# Patient Record
Sex: MALE | Race: WHITE | URBAN - METROPOLITAN AREA
[De-identification: names, ages, dates, MRNs, and addresses within clinical notes are randomized per-mention and may not be internally consistent; named-entity substitution may affect disease eponyms.]

---

## 2022-10-27 ENCOUNTER — APPOINTMENT (RX ONLY)
Dept: URBAN - METROPOLITAN AREA CLINIC 81 | Facility: CLINIC | Age: 57
Setting detail: DERMATOLOGY
End: 2022-10-27

## 2022-10-27 DIAGNOSIS — M33.1 OTHER DERMATOMYOSITIS: ICD-10-CM | Status: INADEQUATELY CONTROLLED

## 2022-10-27 PROBLEM — L30.9 DERMATITIS, UNSPECIFIED: Status: ACTIVE | Noted: 2022-10-27

## 2022-10-27 PROCEDURE — ? PRESCRIPTION MEDICATION MANAGEMENT

## 2022-10-27 PROCEDURE — ? PRESCRIPTION

## 2022-10-27 PROCEDURE — 99204 OFFICE O/P NEW MOD 45 MIN: CPT

## 2022-10-27 PROCEDURE — ? COUNSELING

## 2022-10-27 PROCEDURE — ? FULL BODY SKIN EXAM - DECLINED

## 2022-10-27 PROCEDURE — ? REFERRAL

## 2022-10-27 PROCEDURE — ? ORDER TESTS

## 2022-10-27 PROCEDURE — ? ADDITIONAL NOTES

## 2022-10-27 RX ORDER — OMEPRAZOLE 20 MG/1
CAPSULE, DELAYED RELEASE ORAL
Qty: 60 | Refills: 1 | Status: ERX

## 2022-10-27 RX ORDER — PREDNISONE 20 MG/1
TABLET ORAL
Qty: 40 | Refills: 0 | Status: ERX

## 2022-10-27 ASSESSMENT — LOCATION ZONE DERM: LOCATION ZONE: TRUNK

## 2022-10-27 ASSESSMENT — SEVERITY ASSESSMENT: SEVERITY: MODERATE TO SEVERE

## 2022-10-27 ASSESSMENT — LOCATION DETAILED DESCRIPTION DERM: LOCATION DETAILED: LEFT MEDIAL SUPERIOR CHEST

## 2022-10-27 ASSESSMENT — LOCATION SIMPLE DESCRIPTION DERM: LOCATION SIMPLE: CHEST

## 2022-10-27 NOTE — HPI: OTHER
Condition:: Dermatomyocitis \\n
Please Describe Your Condition:: Patient presents with heart problems, COPD, and breaking out on face and head. PCP suspects dermatomyositis. Patient had lower leg EMG where nerve damage was noticed. Patient notes loss in muscle, fatigue, and nerve pain. Nerve pain started 3 months ago, sensation is spreading to other parts of the body.

## 2022-10-27 NOTE — PROCEDURE: ADDITIONAL NOTES
Additional Notes: Patient reports heart palpitations in the last year, as well as COPD that have seemed to intensified simultaneously. Patient notes deep purple rash starting around the eye that would spread out to other parts of face. On fourth round of prednisone and visually rash is improving. Patient notes generalized dramatic muscle loss in the last couple of weeks. Thrombosis of capillary loops on nails present upon examination. \\n\\nPatient notes that rash was also located under arm and on scalp at the same time as it was present around his eyes. Scalp scabbed over white. Prednisone was able to calm this significantly. Subtle rash reported on neck and collar bone area.\\n\\nNo history of asthma in childhood or previous smoking. Respiratory issues began 6 years ago per pt and heart issues 1 year ago. Nothing definitively diagnosed regarding heart issues. Patient recently had heart monitored and nothing remarkable was noted. No lesions presents or have been present on knuckles per patient. Lab and diagnostic work-up should be done as patient is clinically suspicious for dermatomyositis. Prednisone and omeprazole ordered today. Patient counseled today on sunscreen use. Recommend undergoing search for potential malignancy through PCP (including colonoscopy and CT). Gave patient contact information for a local rheumatologist (Franko Ashraf MD). RTC 1 month for re-evaluation.
Render Risk Assessment In Note?: no
Detail Level: Simple

## 2022-10-27 NOTE — PROCEDURE: PRESCRIPTION MEDICATION MANAGEMENT
Initiate Treatment: PREDNISONE 20 mg \\nOMEPRAZOLE 20 mg BID PRN Dyspepsia/acid reflex sx secondary to prednisone \\nMagnesium and Calcium OTC supplement daily recommended for prolonged prednisone therapy.

## 2022-10-27 NOTE — PROCEDURE: ORDER TESTS
Expected Date Of Service: 10/27/2022
Performing Laboratory: 0
Bill For Surgical Tray: no
Billing Type: Third-Party Bill

## 2022-10-27 NOTE — PROCEDURE: PRESCRIPTION MEDICATION MANAGEMENT
Plan: Referred to Franko Ashraf MD (Rheumatology)\\nF/u with PCP for GI referral for colonoscopy \\nPCP to order CT of Chest/Abdomen/pelvis to r/o malignancy.\\nF/u with cardiology and pulmonology- pt is already established.  \\nLabs ordered.  Advised pt to go to [employer].  Miscellaneous lab - handwritten test code on order form today (myositis panel [id number])\\nContinue prednisone.  New Rx for 20mg qam x 40 days, RTC to clinic in 1 month.  \\nHold off on Bx today - Rash is not present on exam d/t current prednisone therapy. Plan: Referred to Franko Ashraf MD (Rheumatology)\\nF/u with PCP for GI referral for colonoscopy \\nPCP to order CT of Chest/Abdomen/pelvis to r/o malignancy.\\nF/u with cardiology and pulmonology- pt is already established.  \\nLabs ordered.  Advised pt to go to [facility].  Miscellaneous lab - handwritten test code on order form today (myositis panel [id number])\\nContinue prednisone.  New Rx for 20mg qam x 40 days, RTC to clinic in 1 month.  \\nHold off on Bx today - Rash is not present on exam d/t current prednisone therapy.

## 2022-11-29 ENCOUNTER — APPOINTMENT (RX ONLY)
Dept: URBAN - METROPOLITAN AREA CLINIC 81 | Facility: CLINIC | Age: 57
Setting detail: DERMATOLOGY
End: 2022-11-29

## 2022-11-29 DIAGNOSIS — M33.1 OTHER DERMATOMYOSITIS: ICD-10-CM

## 2022-11-29 PROBLEM — L30.9 DERMATITIS, UNSPECIFIED: Status: ACTIVE | Noted: 2022-11-29

## 2022-11-29 PROCEDURE — ? ADDITIONAL NOTES

## 2022-11-29 PROCEDURE — ? LAB REPORTS REVIEWED

## 2022-11-29 PROCEDURE — ? FULL BODY SKIN EXAM - DECLINED

## 2022-11-29 PROCEDURE — ? PRESCRIPTION MEDICATION MANAGEMENT

## 2022-11-29 PROCEDURE — 99214 OFFICE O/P EST MOD 30 MIN: CPT

## 2022-11-29 PROCEDURE — ? COUNSELING

## 2022-11-29 ASSESSMENT — LOCATION SIMPLE DESCRIPTION DERM: LOCATION SIMPLE: CHEST

## 2022-11-29 ASSESSMENT — LOCATION ZONE DERM: LOCATION ZONE: TRUNK

## 2022-11-29 ASSESSMENT — LOCATION DETAILED DESCRIPTION DERM: LOCATION DETAILED: LEFT MEDIAL SUPERIOR CHEST

## 2022-11-29 NOTE — PROCEDURE: PRESCRIPTION MEDICATION MANAGEMENT
Plan: Continue to f/u with Franko Ashraf MD (Rheumatology)\\nPt reports CT CAP was unremarkable.  Colonoscopy is scheduled for next week.\\nF/u with cardiology and pulmonology- pt is already established.  \\nHold off on Bx today - Rash is not present on exam d/t current meds.  Advised to RTC for Bx in the future if rash returns.

## 2022-11-29 NOTE — PROCEDURE: PRESCRIPTION MEDICATION MANAGEMENT
Continue Regimen: Current medications Rx by rheumatology.\\nDermatomyositis panel was negative, INGRID Positive.  \\nRecommended to discuss with rheumatology and PCP and f/u with [facility] neuromuscular team d/t sx of progressive muscle weakness and nerve pain for further eval.  \\nOMEPRAZOLE 20 mg BID PRN Dyspepsia/acid reflex sx secondary to prednisone \\nMagnesium and Calcium OTC supplement daily recommended for prolonged prednisone therapy. Continue Regimen: Current medications Rx by rheumatology.\\nDermatomyositis panel was negative, INGRID Positive.  \\nRecommended to discuss with rheumatology and PCP and f/u with [facility] neuromuscular team d/t sx of progressive muscle weakness and nerve pain for further eval.  \\nOMEPRAZOLE 20 mg BID PRN Dyspepsia/acid reflex sx secondary to prednisone \\nMagnesium and Calcium OTC supplement daily recommended for prolonged prednisone therapy.

## 2023-01-26 ENCOUNTER — APPOINTMENT (RX ONLY)
Dept: URBAN - METROPOLITAN AREA CLINIC 81 | Facility: CLINIC | Age: 58
Setting detail: DERMATOLOGY
End: 2023-01-26

## 2023-01-26 DIAGNOSIS — M33.1 OTHER DERMATOMYOSITIS: ICD-10-CM

## 2023-01-26 PROBLEM — L30.9 DERMATITIS, UNSPECIFIED: Status: ACTIVE | Noted: 2023-01-26

## 2023-01-26 PROCEDURE — ? ORDER TESTS

## 2023-01-26 PROCEDURE — 99214 OFFICE O/P EST MOD 30 MIN: CPT

## 2023-01-26 PROCEDURE — ? ADDITIONAL NOTES

## 2023-01-26 PROCEDURE — ? PRESCRIPTION

## 2023-01-26 PROCEDURE — ? PRESCRIPTION MEDICATION MANAGEMENT

## 2023-01-26 PROCEDURE — ? FULL BODY SKIN EXAM - DECLINED

## 2023-01-26 PROCEDURE — ? COUNSELING

## 2023-01-26 RX ORDER — HYDROXYZINE HYDROCHLORIDE 50 MG/1
TABLET, FILM COATED ORAL
Qty: 90 | Refills: 1 | Status: ERX

## 2023-01-26 ASSESSMENT — LOCATION ZONE DERM: LOCATION ZONE: TRUNK

## 2023-01-26 ASSESSMENT — LOCATION DETAILED DESCRIPTION DERM: LOCATION DETAILED: LEFT MEDIAL SUPERIOR CHEST

## 2023-01-26 ASSESSMENT — LOCATION SIMPLE DESCRIPTION DERM: LOCATION SIMPLE: CHEST

## 2023-01-26 NOTE — PROCEDURE: PRESCRIPTION MEDICATION MANAGEMENT
Detail Level: Detailed
Render In Strict Bullet Format?: No
Continue Regimen: Current medications Rx by rheumatology - azathioprine, starting hydroxychloroquine after cleared by ophthalmologist.  He continues to take prednisone 20mg BID.  \\nDermatomyositis panel was negative, INGRID Positive.  \\nRecommended to discuss with rheumatology and PCP.  Pt with progressive muscle weakness and paresthesias - has appt at [facility] neuromuscular team, but can’t get in to be seen until 8/2023.  Will call and reach out to practice and see if I can get pt in sooner.  \\nOMEPRAZOLE 20 mg BID PRN Dyspepsia/acid reflex sx secondary to prednisone \\nMagnesium and Calcium OTC supplement daily recommended for prolonged prednisone therapy.
Plan: Continue to f/u with Franko Ashraf MD (Rheumatology)\\nPt reports CT CAP was unremarkable.  Colonoscopy clear, scheduled for upper GI study.\\nPt with insomnia from prednisone - Rx hydroxyzine for sleep today.  \\nLabs reviewed.  With concern for malignancy, PSA ordered as well as f/u labs.  \\nF/u with cardiology and pulmonology- pt is already established.  Holster monitor was Normal per pt.  \\nHold off on Bx today - Rash is not present on exam d/t current meds.  Advised to RTC for Bx in the future if rash returns.

## 2023-01-26 NOTE — PROCEDURE: ADDITIONAL NOTES
Additional Notes: Patient reports heart palpitations in the last year, as well as COPD that have seemed to intensified simultaneously. Initial rash:  Patient notes deep purple rash starting around the eye that would spread out to other parts of face. Patient notes generalized dramatic muscle loss and numbness - worsening. Thrombosis of capillary loops on nails present upon examination. \\n\\nPatient notes that in the beginning his rash was also located under arm and on scalp at the same time as it was present around his eyes. Prednisone was able to calm this significantly. Subtle rash reported on neck and collar bone area as prior visit - not present today.\\n\\nNo history of asthma in childhood or previous smoking. Respiratory issues began 6 years ago per pt and heart issues 1 year ago. Nothing definitively diagnosed regarding heart issues. Patient recently had heart monitored and nothing remarkable was noted. No lesions presents or have been present on knuckles per patient.
Render Risk Assessment In Note?: no
Detail Level: Simple

## 2023-01-26 NOTE — PROCEDURE: ORDER TESTS
Bill For Surgical Tray: no
Billing Type: Third-Party Bill
Expected Date Of Service: 01/26/2023
Performing Laboratory: 0

## 2023-03-20 ENCOUNTER — APPOINTMENT (RX ONLY)
Dept: URBAN - METROPOLITAN AREA CLINIC 81 | Facility: CLINIC | Age: 58
Setting detail: DERMATOLOGY
End: 2023-03-20

## 2023-03-20 DIAGNOSIS — L30.9 DERMATITIS, UNSPECIFIED: ICD-10-CM | Status: INADEQUATELY CONTROLLED

## 2023-03-20 PROCEDURE — ? PRESCRIPTION MEDICATION MANAGEMENT

## 2023-03-20 PROCEDURE — ? PHOTO-DOCUMENTATION

## 2023-03-20 PROCEDURE — ? BIOPSY BY PUNCH METHOD

## 2023-03-20 PROCEDURE — ? TREATMENT REGIMEN

## 2023-03-20 PROCEDURE — 11105 PUNCH BX SKIN EA SEP/ADDL: CPT

## 2023-03-20 PROCEDURE — ? COUNSELING

## 2023-03-20 PROCEDURE — 11104 PUNCH BX SKIN SINGLE LESION: CPT

## 2023-03-20 PROCEDURE — ? ORDER TESTS

## 2023-03-20 ASSESSMENT — LOCATION SIMPLE DESCRIPTION DERM
LOCATION SIMPLE: LEFT CHEEK
LOCATION SIMPLE: LOWER BACK
LOCATION SIMPLE: RIGHT CHEEK
LOCATION SIMPLE: LEFT LOWER BACK
LOCATION SIMPLE: LEFT EYEBROW
LOCATION SIMPLE: LEFT MIDDLE FINGER
LOCATION SIMPLE: RIGHT HAND
LOCATION SIMPLE: RIGHT MIDDLE FINGER
LOCATION SIMPLE: RIGHT EYEBROW
LOCATION SIMPLE: LEFT HAND
LOCATION SIMPLE: CHEST

## 2023-03-20 ASSESSMENT — LOCATION DETAILED DESCRIPTION DERM
LOCATION DETAILED: RIGHT DISTAL DORSAL MIDDLE FINGER
LOCATION DETAILED: LEFT CENTRAL MALAR CHEEK
LOCATION DETAILED: 3RD WEB SPACE LEFT HAND
LOCATION DETAILED: STERNAL NOTCH
LOCATION DETAILED: RIGHT CENTRAL EYEBROW
LOCATION DETAILED: SUPERIOR LUMBAR SPINE
LOCATION DETAILED: RIGHT SUPERIOR CENTRAL MALAR CHEEK
LOCATION DETAILED: 3RD WEB SPACE RIGHT HAND
LOCATION DETAILED: LEFT SUPERIOR MEDIAL LOWER BACK
LOCATION DETAILED: LEFT MID DORSAL MIDDLE FINGER
LOCATION DETAILED: LEFT CENTRAL EYEBROW

## 2023-03-20 ASSESSMENT — LOCATION ZONE DERM
LOCATION ZONE: FINGER
LOCATION ZONE: TRUNK
LOCATION ZONE: FACE
LOCATION ZONE: HAND

## 2023-03-20 NOTE — PROCEDURE: ORDER TESTS
Billing Type: Third-Party Bill
Performing Laboratory: 0
Bill For Surgical Tray: no
Expected Date Of Service: 03/20/2023

## 2023-03-20 NOTE — PROCEDURE: BIOPSY BY PUNCH METHOD

## 2023-03-20 NOTE — PROCEDURE: TREATMENT REGIMEN
Detail Level: Zone
Plan: Pt seen for unspecified rash + muscle weakness + sensory neuropathy + respiratory weakness in November 2022.\\n\\nNovember 2022:\\n- Pt seen for unspecified rash + muscle weakness + sensory neuropathy + respiratory weakness \\n- Initial rash: Patient notes deep purple rash starting around the eyes, malar cheeks, scalp, neck. Thrombosis of capillary loops on nails were reported. No lesions on knuckles reported.\\n- Questionable hx of COPD per previous notes. Respiratory issues that began in 2016 per previous notes.\\n- myositis workup was completed which was unremarkable except for mildly positive INGRID at 1:80 with speckled pattern\\n- started on prednisone 60 mg daily (only took 40 mg daily and currently continued).\\n\\nJan 2023:\\n- referred to rheumatology ( Franko Ashraf MD) and was evaluated with CT CAP and colonoscopy (unremarkable)\\n?plans to have an upper GI study completed; pending\\n?started on azathioprine + hydroxychloroquine\\n- At derm visit:\\n?continued on prednisone 40 mg daily + omeprazole 20 mg BID + magnesium and calcium OTC supplements\\n?started on hydroxyzine for sleep\\n?reportedly no cutaneous findings and instructed to return if rash should appear\\n\\nMarch 2023:\\n-patient reportedly has been continuing to take 40 mg prednisone daily; discussed that this is likely the cause of his current insomnia and we will need to review his current therapy to maximize his steroid sparing agents; will discuss this with his rheumatologist\\n-biopsies were done today for DIF and H&E\\n-rash was significant for a symmetric erythematous rash on the upper eyelids, malar cheeks, neck, dorsal digits in addition to scaly serpiginous erythematous plaques on the lower back\\n- no capillary changes were noted on examination today or changes to the cuticles of the hands.\\n- continued to endorse progressive muscle weakness\\n- we will continue his current therapeutic regimen and discuss with rheumatology for future changes to therapy.\\n- will also order basic labs for DM, SCLE, and otherwise connective tissue/AI disorders.\\n- patient is currently on a statin which may cause a drug induced Dermatomyositis which should be in consideration; he was instructed to stop this medication for now\\n- should DM be definitive diagnosis, we will proceed with q 2-3 month follow up examinations + monitoring of muscle enzymes and will plan to try for prednisone only on flares of the condition\\n- patient should also be instructed to stop hydroxyzine for sleep as his insomnia is related to the high dose of prednisone

## 2023-03-20 NOTE — PROCEDURE: PRESCRIPTION MEDICATION MANAGEMENT
Detail Level: Zone
Render In Strict Bullet Format?: No
Continue Regimen: hydroxyzine HCl 50 mg tablet \\nTake 1 tab PO QHS for insomnia.

## 2023-04-03 ENCOUNTER — APPOINTMENT (RX ONLY)
Dept: URBAN - METROPOLITAN AREA CLINIC 81 | Facility: CLINIC | Age: 58
Setting detail: DERMATOLOGY
End: 2023-04-03

## 2023-04-03 DIAGNOSIS — L40.0 PSORIASIS VULGARIS: ICD-10-CM

## 2023-04-03 DIAGNOSIS — L30.9 DERMATITIS, UNSPECIFIED: ICD-10-CM

## 2023-04-03 PROCEDURE — ? COUNSELING

## 2023-04-03 PROCEDURE — ? PRESCRIPTION

## 2023-04-03 PROCEDURE — ? TREATMENT REGIMEN

## 2023-04-03 PROCEDURE — 99214 OFFICE O/P EST MOD 30 MIN: CPT

## 2023-04-03 PROCEDURE — ? PHOTO-DOCUMENTATION

## 2023-04-03 ASSESSMENT — LOCATION ZONE DERM
LOCATION ZONE: HAND
LOCATION ZONE: FINGER
LOCATION ZONE: FACE
LOCATION ZONE: TRUNK

## 2023-04-03 ASSESSMENT — LOCATION SIMPLE DESCRIPTION DERM
LOCATION SIMPLE: LEFT HAND
LOCATION SIMPLE: RIGHT EYEBROW
LOCATION SIMPLE: LEFT EYEBROW
LOCATION SIMPLE: LEFT CHEEK
LOCATION SIMPLE: RIGHT CHEEK
LOCATION SIMPLE: LEFT MIDDLE FINGER
LOCATION SIMPLE: CHEST
LOCATION SIMPLE: RIGHT MIDDLE FINGER
LOCATION SIMPLE: LOWER BACK
LOCATION SIMPLE: RIGHT HAND

## 2023-04-03 ASSESSMENT — LOCATION DETAILED DESCRIPTION DERM
LOCATION DETAILED: RIGHT DISTAL DORSAL MIDDLE FINGER
LOCATION DETAILED: LEFT CENTRAL MALAR CHEEK
LOCATION DETAILED: SUPERIOR LUMBAR SPINE
LOCATION DETAILED: 3RD WEB SPACE LEFT HAND
LOCATION DETAILED: 3RD WEB SPACE RIGHT HAND
LOCATION DETAILED: LEFT CENTRAL EYEBROW
LOCATION DETAILED: RIGHT SUPERIOR CENTRAL MALAR CHEEK
LOCATION DETAILED: STERNAL NOTCH
LOCATION DETAILED: RIGHT CENTRAL EYEBROW
LOCATION DETAILED: LEFT MID DORSAL MIDDLE FINGER

## 2023-04-03 NOTE — PROCEDURE: TREATMENT REGIMEN
Detail Level: Zone
Plan: Pt seen for unspecified rash + muscle weakness + sensory neuropathy + respiratory weakness in November 2022.\\n\\nNovember 2022:\\n- Pt seen for unspecified rash + muscle weakness + sensory neuropathy + respiratory weakness \\n- Initial rash: Patient notes deep purple rash starting around the eyes, malar cheeks, scalp, neck. Thrombosis of capillary loops on nails were reported. No lesions on knuckles reported.\\n- Questionable hx of COPD per previous notes. Respiratory issues that began in 2016 per previous notes.\\n- myositis workup was completed which was unremarkable except for mildly positive INGRID at 1:80 with speckled pattern\\n- started on prednisone 60 mg daily (only took 40 mg daily and currently continued).\\n\\nJan 2023:\\n- referred to rheumatology ( Franko Ashraf MD) and was evaluated with CT CAP and colonoscopy (unremarkable)\\n?plans to have an upper GI study completed; pending\\n?started on azathioprine + hydroxychloroquine\\n- At derm visit:\\n?continued on prednisone 40 mg daily + omeprazole 20 mg BID + magnesium and calcium OTC supplements\\n?started on hydroxyzine for sleep\\n?reportedly no cutaneous findings and instructed to return if rash should appear\\n\\nMarch 2023:\\n-patient reportedly has been continuing to take 40 mg prednisone daily; discussed that this is likely the cause of his current insomnia and we will need to review his current therapy to maximize his steroid sparing agents; will discuss this with his rheumatologist\\n-biopsies were done today for DIF and H&E\\n-rash was significant for a symmetric erythematous rash on the upper eyelids, malar cheeks, neck, dorsal digits in addition to scaly serpiginous erythematous plaques on the lower back\\n- no capillary changes were noted on examination today or changes to the cuticles of the hands.\\n- continued to endorse progressive muscle weakness\\n- we will continue his current therapeutic regimen and discuss with rheumatology for future changes to therapy.\\n- will also order basic labs for DM, SCLE, and otherwise connective tissue/AI disorders.\\n- patient is currently on a statin which may cause a drug induced Dermatomyositis which should be in consideration; he was instructed to stop this medication for now\\n- should DM be definitive diagnosis, we will proceed with q 2-3 month follow up examinations + monitoring of muscle enzymes and will plan to try for prednisone only on flares of the condition\\n- patient should also be instructed to stop hydroxyzine for sleep as his insomnia is related to the high dose of prednisone\\n\\nApril 2023:\\n- punch biopsy DIF negative, H&E highly suggestive of guttate psoriasis; this may be associated with his current plaquenil use.\\n- 4/3/2023; no lab results received from March visit. There seems to be no clear evidence of a true diagnosis of dermatomyositis at this point as previous labs were unremarkable for this diagnosis and recent biopsy was not suggestive of this. No cutaneous signs of DM on this visit. Patient was instructed to begin a steroid taper as this medication is likely causing his insomnia and only utilized in cases of DM for active disease, not long term as this should be done with steroid sparing agents. We will taper the patient from his 40 mg dose starting tomorrow 30 mg x4 days then 20 mg x4 days then 10 mg x4 days then stop. He was also instructed to discontinue use of hydroxyzine on last visit which is not using.\\n- pending the results of his laboratory results and rheumatologist visit on 4/14/2023, we will consider next steps which may include discussion with rheumatology on potential of tapering and discontinuing his current should we not have a clear diagnosis of dermatomyositis

## 2023-04-04 RX ORDER — CLOBETASOL PROPIONATE 0.5 MG/G
CREAM TOPICAL
Qty: 60 | Refills: 2 | Status: ERX

## 2023-04-17 ENCOUNTER — APPOINTMENT (RX ONLY)
Dept: URBAN - METROPOLITAN AREA CLINIC 81 | Facility: CLINIC | Age: 58
Setting detail: DERMATOLOGY
End: 2023-04-17

## 2023-04-17 DIAGNOSIS — L30.9 DERMATITIS, UNSPECIFIED: ICD-10-CM

## 2023-04-17 DIAGNOSIS — L40.0 PSORIASIS VULGARIS: ICD-10-CM

## 2023-04-17 PROCEDURE — ? TREATMENT REGIMEN

## 2023-04-17 PROCEDURE — ? PHOTO-DOCUMENTATION

## 2023-04-17 PROCEDURE — ? COUNSELING

## 2023-04-17 PROCEDURE — 99213 OFFICE O/P EST LOW 20 MIN: CPT

## 2023-04-17 ASSESSMENT — LOCATION DETAILED DESCRIPTION DERM
LOCATION DETAILED: RIGHT SUPERIOR CENTRAL MALAR CHEEK
LOCATION DETAILED: 3RD WEB SPACE LEFT HAND
LOCATION DETAILED: LEFT MID DORSAL MIDDLE FINGER
LOCATION DETAILED: SUPERIOR LUMBAR SPINE
LOCATION DETAILED: RIGHT DISTAL DORSAL MIDDLE FINGER
LOCATION DETAILED: STERNAL NOTCH
LOCATION DETAILED: LEFT CENTRAL EYEBROW
LOCATION DETAILED: LEFT CENTRAL MALAR CHEEK
LOCATION DETAILED: 3RD WEB SPACE RIGHT HAND
LOCATION DETAILED: RIGHT CENTRAL EYEBROW

## 2023-04-17 ASSESSMENT — LOCATION ZONE DERM
LOCATION ZONE: TRUNK
LOCATION ZONE: FACE
LOCATION ZONE: HAND
LOCATION ZONE: FINGER

## 2023-04-17 ASSESSMENT — LOCATION SIMPLE DESCRIPTION DERM
LOCATION SIMPLE: RIGHT EYEBROW
LOCATION SIMPLE: RIGHT MIDDLE FINGER
LOCATION SIMPLE: LEFT MIDDLE FINGER
LOCATION SIMPLE: LEFT HAND
LOCATION SIMPLE: RIGHT HAND
LOCATION SIMPLE: CHEST
LOCATION SIMPLE: LEFT EYEBROW
LOCATION SIMPLE: LOWER BACK
LOCATION SIMPLE: LEFT CHEEK
LOCATION SIMPLE: RIGHT CHEEK

## 2023-04-17 NOTE — PROCEDURE: TREATMENT REGIMEN
Detail Level: Zone
Plan: Current therapy as of 4/17/2023:\\n- cellcept, plaquenil, tapering off prednisone currently on 20 mg daily\\n\\n\\nFull history of therapy below:\\n\\nNovember 2022:\\n- Pt seen for unspecified rash + muscle weakness + sensory neuropathy + respiratory weakness \\n- Initial rash: Patient notes deep purple rash starting around the eyes, malar cheeks, scalp, neck. Thrombosis of capillary loops on nails were reported. No lesions on knuckles reported.\\n- Questionable hx of COPD per previous notes. Respiratory issues that began in 2016 per previous notes.\\n- myositis workup was completed which was unremarkable except for mildly positive INGRID at 1:80 with speckled pattern\\n- started on prednisone 60 mg daily (only took 40 mg daily and currently continued).\\n\\nJan 2023:\\n- referred to rheumatology ( Franko Ashraf MD) and was evaluated with CT CAP and colonoscopy (unremarkable)\\n- plans to have an upper GI study completed; pending\\n-started on azathioprine + hydroxychloroquine\\n- At derm visit:\\n-continued on prednisone 40 mg daily + omeprazole 20 mg BID + magnesium and calcium OTC supplements\\n-started on hydroxyzine for sleep\\n-reportedly no cutaneous findings and instructed to return if rash should appear\\n\\nMarch 2023:\\n-patient reportedly has been continuing to take 40 mg prednisone daily; discussed that this is likely the cause of his current insomnia and we will need to review his current therapy to maximize his steroid sparing agents; will discuss this with his rheumatologist\\n-biopsies were done today for DIF and H&E\\n-rash was significant for a symmetric erythematous rash on the upper eyelids, malar cheeks, neck, dorsal digits in addition to scaly serpiginous erythematous plaques on the lower back\\n- no capillary changes were noted on examination today or changes to the cuticles of the hands.\\n- continued to endorse progressive muscle weakness\\n- we will continue his current therapeutic regimen and discuss with rheumatology for future changes to therapy.\\n- will also order basic labs for DM, SCLE, and otherwise connective tissue/AI disorders.\\n- patient is currently on a statin which may cause a drug induced Dermatomyositis which should be in consideration; he was instructed to stop this medication for now\\n- should DM be definitive diagnosis, we will proceed with q 2-3 month follow up examinations + monitoring of muscle enzymes and will plan to try for prednisone only on flares of the condition\\n- patient should also be instructed to stop hydroxyzine for sleep as his insomnia is related to the high dose of prednisone\\n\\nApril 2023:\\n- punch biopsy DIF negative, H&E highly suggestive of guttate psoriasis; this may be associated with his current plaquenil use.\\n- 4/3/2023; no lab results received from March visit. There seems to be no clear evidence of a true diagnosis of dermatomyositis at this point as previous labs were unremarkable for this diagnosis and recent biopsy was not suggestive of this. No cutaneous signs of DM on this visit. Patient was instructed to begin a steroid taper as this medication is likely causing his insomnia and only utilized in cases of DM for active disease, not long term as this should be done with steroid sparing agents. We will taper the patient from his 40 mg dose starting tomorrow 30 mg x4 days then 20 mg x4 days then 10 mg x4 days then stop. He was also instructed to discontinue use of hydroxyzine on last visit which is not using.\\n- 4/17/2023; labs were negative for DM. Saw rheumatology after psoriasis biopsy and labs. They would like for us to start humira. They have also extended his steroid taper to now, 20 mg x1 week, 15 mg x1 week, 5 mg x1 week. I would be hesitant to starting humira however this medication may induce dermatomyositis so would be hesitant if rheum still considering DM. I do not feel this patient is a true DM case as the patient describes more of a nerve pain in the legs rather than muscle weakness and lack of any labs demonstrating DM. We will see patient back every 2 weeks as we are tapering off the steroids. Will need to follow up with rheum notes (requested on 4/17/2023) and consider discussion on discontinuation of his current immunosuppressives at some point if no evidence of DM. 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
Continue Regimen: Clobetasol 0.05% cream bid

## 2023-05-01 ENCOUNTER — APPOINTMENT (RX ONLY)
Dept: URBAN - METROPOLITAN AREA CLINIC 81 | Facility: CLINIC | Age: 58
Setting detail: DERMATOLOGY
End: 2023-05-01

## 2023-05-01 DIAGNOSIS — L40.0 PSORIASIS VULGARIS: ICD-10-CM | Status: IMPROVED

## 2023-05-01 DIAGNOSIS — L30.9 DERMATITIS, UNSPECIFIED: ICD-10-CM

## 2023-05-01 PROCEDURE — ? COUNSELING

## 2023-05-01 PROCEDURE — 99213 OFFICE O/P EST LOW 20 MIN: CPT

## 2023-05-01 PROCEDURE — ? PRESCRIPTION MEDICATION MANAGEMENT

## 2023-05-01 PROCEDURE — ? ADDITIONAL NOTES

## 2023-05-01 ASSESSMENT — LOCATION ZONE DERM
LOCATION ZONE: TRUNK
LOCATION ZONE: FACE

## 2023-05-01 ASSESSMENT — LOCATION DETAILED DESCRIPTION DERM
LOCATION DETAILED: LEFT INFERIOR MEDIAL FOREHEAD
LOCATION DETAILED: RIGHT SUPERIOR MEDIAL LOWER BACK
LOCATION DETAILED: STERNUM
LOCATION DETAILED: LEFT SUPERIOR MEDIAL LOWER BACK
LOCATION DETAILED: SUPERIOR LUMBAR SPINE

## 2023-05-01 ASSESSMENT — LOCATION SIMPLE DESCRIPTION DERM
LOCATION SIMPLE: CHEST
LOCATION SIMPLE: RIGHT LOWER BACK
LOCATION SIMPLE: LEFT FOREHEAD
LOCATION SIMPLE: LEFT LOWER BACK
LOCATION SIMPLE: LOWER BACK

## 2023-05-01 NOTE — PROCEDURE: ADDITIONAL NOTES
Additional Notes: November 2022:\\n- Pt seen for unspecified rash + muscle weakness + sensory neuropathy + respiratory weakness\\n- Initial rash: Patient notes deep purple rash starting around the eyes, malar cheeks, scalp, neck.\\nThrombosis of capillary loops on nails were reported. No lesions on knuckles reported.\\n- Questionable hx of COPD per previous notes. Respiratory issues that began in 2016 per previous\\nnotes.\\n- myositis workup was completed which was unremarkable except for mildly positive INGRID at 1:80\\nwith speckled pattern\\n- started on prednisone 60 mg daily (only took 40 mg daily and currently continued).\\nJan 2023:\\n- referred to rheumatology ( Franko Ashraf MD) and was evaluated with CT CAP and colonoscopy\\n(unremarkable)\\n?plans to have an upper GI study completed; pending\\n?started on azathioprine + hydroxychloroquine\\n- At derm visit:\\n?continued on prednisone 40 mg daily + omeprazole 20 mg BID + magnesium and calcium OTC\\nsupplements\\n?started on hydroxyzine for sleep\\n?reportedly no cutaneous findings and instructed to return if rash should appear\\nMarch 2023:\\n-patient reportedly has been continuing to take 40 mg prednisone daily; discussed that this is likely\\nthe cause of his current insomnia and we will need to review his current therapy to maximize his\\nsteroid sparing agents; will discuss this with his rheumatologist\\n-biopsies were done today for DIF and H&E\\n-rash was significant for a symmetric erythematous rash on the upper eyelids, malar cheeks, neck,\\ndorsal digits in addition to scaly serpiginous erythematous plaques on the lower back\\n- no capillary changes were noted on examination today or changes to the cuticles of the hands.\\n- continued to endorse progressive muscle weakness\\n- we will continue his current therapeutic regimen and discuss with rheumatology for future changes\\nto therapy.\\n- will also order basic labs for DM, SCLE, and otherwise connective tissue/AI disorders.\\n- patient is currently on a statin which may cause a drug induced Dermatomyositis which should be in\\nconsideration; he was instructed to stop this medication for now\\n- should DM be definitive diagnosis, we will proceed with q 2-3 month follow up examinations +\\nmonitoring of muscle enzymes and will plan to try for prednisone only on flares of the condition\\n- patient should also be instructed to stop hydroxyzine for sleep as his insomnia is related to the high\\ndose of prednisone\\nApril 2023:\\n- punch biopsy DIF negative, H&E highly suggestive of guttate psoriasis; this may be associated with\\nhis current plaquenil use.\\n- 4/3/2023; no lab results received from March visit. There seems to be no clear evidence of a true\\ndiagnosis of dermatomyositis at this point as previous labs were unremarkable for this diagnosis and\\nrecent biopsy was not suggestive of this. No cutaneous signs of DM on this visit. Patient was\\ninstructed to begin a steroid taper as this medication is likely causing his insomnia and only utilized in\\ncases of DM for active disease, not long term as this should be done with steroid sparing agents. We\\nwill taper the patient from his 40 mg dose starting tomorrow 30 mg x4 days then 20 mg x4 days then\\n10 mg x4 days then stop. He was also instructed to discontinue use of hydroxyzine on last visit which\\nis not using.\\n- pending the results of his laboratory results and rheumatologist visit on 4/14/2023, we will consider\\nnext steps which may include discussion with rheumatology on potential of tapering and\\ndiscontinuing his current should we not have a clear diagnosis of dermatomyositis. \\n\\n***5/1/23\\n- Pt completely clear today. Currently tapering off prednisone. Still on cellcept of 500mg BID and Plaquenil 300mg daily managed by his rhuematologist (franko Ashraf -[phone]) Do not favor dermatomyositis due to negative antibodies and nonspecific rash. Completely clear today and hasnt flared for a few weeks. PT instructed to come in during rash flare for biopsy. I believe Psoriasiform rash on back is completely separate from current symptoms and erythematous rash present in pictures on patients phone, now currently in [facility]. Pt to see neurology concerning muscle atrophy and paresthesias of hands and feet. Will monitor pt to see how he does off prednisone and will discuss with rheumatologist current medication plans. I see no need to be on plaquenil or cellcept at this point, as we have no concrete diagnosis. No need for systemic treatment with biologics such as humira. Can controll focal area of psoriasis with clobetasol. Additional Notes: November 2022:\\n- Pt seen for unspecified rash + muscle weakness + sensory neuropathy + respiratory weakness\\n- Initial rash: Patient notes deep purple rash starting around the eyes, malar cheeks, scalp, neck.\\nThrombosis of capillary loops on nails were reported. No lesions on knuckles reported.\\n- Questionable hx of COPD per previous notes. Respiratory issues that began in 2016 per previous\\nnotes.\\n- myositis workup was completed which was unremarkable except for mildly positive INGRID at 1:80\\nwith speckled pattern\\n- started on prednisone 60 mg daily (only took 40 mg daily and currently continued).\\nJan 2023:\\n- referred to rheumatology ( Franko Ashraf MD) and was evaluated with CT CAP and colonoscopy\\n(unremarkable)\\n?plans to have an upper GI study completed; pending\\n?started on azathioprine + hydroxychloroquine\\n- At derm visit:\\n?continued on prednisone 40 mg daily + omeprazole 20 mg BID + magnesium and calcium OTC\\nsupplements\\n?started on hydroxyzine for sleep\\n?reportedly no cutaneous findings and instructed to return if rash should appear\\nMarch 2023:\\n-patient reportedly has been continuing to take 40 mg prednisone daily; discussed that this is likely\\nthe cause of his current insomnia and we will need to review his current therapy to maximize his\\nsteroid sparing agents; will discuss this with his rheumatologist\\n-biopsies were done today for DIF and H&E\\n-rash was significant for a symmetric erythematous rash on the upper eyelids, malar cheeks, neck,\\ndorsal digits in addition to scaly serpiginous erythematous plaques on the lower back\\n- no capillary changes were noted on examination today or changes to the cuticles of the hands.\\n- continued to endorse progressive muscle weakness\\n- we will continue his current therapeutic regimen and discuss with rheumatology for future changes\\nto therapy.\\n- will also order basic labs for DM, SCLE, and otherwise connective tissue/AI disorders.\\n- patient is currently on a statin which may cause a drug induced Dermatomyositis which should be in\\nconsideration; he was instructed to stop this medication for now\\n- should DM be definitive diagnosis, we will proceed with q 2-3 month follow up examinations +\\nmonitoring of muscle enzymes and will plan to try for prednisone only on flares of the condition\\n- patient should also be instructed to stop hydroxyzine for sleep as his insomnia is related to the high\\ndose of prednisone\\nApril 2023:\\n- punch biopsy DIF negative, H&E highly suggestive of guttate psoriasis; this may be associated with\\nhis current plaquenil use.\\n- 4/3/2023; no lab results received from March visit. There seems to be no clear evidence of a true\\ndiagnosis of dermatomyositis at this point as previous labs were unremarkable for this diagnosis and\\nrecent biopsy was not suggestive of this. No cutaneous signs of DM on this visit. Patient was\\ninstructed to begin a steroid taper as this medication is likely causing his insomnia and only utilized in\\ncases of DM for active disease, not long term as this should be done with steroid sparing agents. We\\nwill taper the patient from his 40 mg dose starting tomorrow 30 mg x4 days then 20 mg x4 days then\\n10 mg x4 days then stop. He was also instructed to discontinue use of hydroxyzine on last visit which\\nis not using.\\n- pending the results of his laboratory results and rheumatologist visit on 4/14/2023, we will consider\\nnext steps which may include discussion with rheumatology on potential of tapering and\\ndiscontinuing his current should we not have a clear diagnosis of dermatomyositis. \\n\\n***5/1/23\\n- Pt completely clear today. Currently tapering off prednisone. Still on cellcept of 500mg BID and Plaquenil 300mg daily managed by his rhuematologist (franko Ashraf -[phone]) Do not favor dermatomyositis due to negative antibodies and nonspecific rash. Completely clear today and hasnt flared for a few weeks. PT instructed to come in during rash flare for biopsy. I believe Psoriasiform rash on back is completely separate from current symptoms and erythematous rash present in pictures on patients phone, now currently in [facility]. Pt to see neurology concerning muscle atrophy and paresthesias of hands and feet. Will monitor pt to see how he does off prednisone and will discuss with rheumatologist current medication plans. I see no need to be on plaquenil or cellcept at this point, as we have no concrete diagnosis. No need for systemic treatment with biologics such as humira. Can controll focal area of psoriasis with clobetasol.

## 2023-06-13 ENCOUNTER — APPOINTMENT (RX ONLY)
Dept: URBAN - METROPOLITAN AREA CLINIC 81 | Facility: CLINIC | Age: 58
Setting detail: DERMATOLOGY
End: 2023-06-13

## 2023-06-13 DIAGNOSIS — B02.29 OTHER POSTHERPETIC NERVOUS SYSTEM INVOLVEMENT: ICD-10-CM | Status: RESOLVING

## 2023-06-13 DIAGNOSIS — M33.1 OTHER DERMATOMYOSITIS: ICD-10-CM

## 2023-06-13 PROBLEM — M33.12 OTHER DERMATOMYOSITIS WITH MYOPATHY: Status: ACTIVE | Noted: 2023-06-13

## 2023-06-13 PROCEDURE — ? ADDITIONAL NOTES

## 2023-06-13 PROCEDURE — ? PRESCRIPTION MEDICATION MANAGEMENT

## 2023-06-13 PROCEDURE — 99213 OFFICE O/P EST LOW 20 MIN: CPT

## 2023-06-13 PROCEDURE — ? COUNSELING

## 2023-06-13 ASSESSMENT — LOCATION DETAILED DESCRIPTION DERM
LOCATION DETAILED: LEFT BUTTOCK
LOCATION DETAILED: LEFT DISTAL CALF
LOCATION DETAILED: LEFT SUPERIOR LATERAL UPPER BACK
LOCATION DETAILED: LEFT DISTAL POSTERIOR THIGH

## 2023-06-13 ASSESSMENT — LOCATION SIMPLE DESCRIPTION DERM
LOCATION SIMPLE: LEFT CALF
LOCATION SIMPLE: LEFT BUTTOCK
LOCATION SIMPLE: LEFT POSTERIOR THIGH
LOCATION SIMPLE: LEFT UPPER BACK

## 2023-06-13 ASSESSMENT — LOCATION ZONE DERM
LOCATION ZONE: LEG
LOCATION ZONE: TRUNK

## 2023-06-13 NOTE — PROCEDURE: PRESCRIPTION MEDICATION MANAGEMENT
Detail Level: Zone
Plan: Patient will see his rheumatologist to follow up on the treatment plan, patient stated he will discuss with the doctor on the following, higher doses of cellcept and decrease prednisone.
Render In Strict Bullet Format?: No
Continue Regimen: Mycophenolate 500 BID \\nHydroxychloriquine \\nPrednisone 25 mg \\nPrescribed from rheumatologist.
Modify Regimen: Suggest higher doses of mycophenolate and to start IV IG .\\nAlso recommend to take calcium supplements.

## 2023-09-05 ENCOUNTER — APPOINTMENT (RX ONLY)
Dept: URBAN - METROPOLITAN AREA CLINIC 81 | Facility: CLINIC | Age: 58
Setting detail: DERMATOLOGY
End: 2023-09-05

## 2023-09-05 DIAGNOSIS — D18.0 HEMANGIOMA: ICD-10-CM

## 2023-09-05 DIAGNOSIS — L82.1 OTHER SEBORRHEIC KERATOSIS: ICD-10-CM

## 2023-09-05 PROBLEM — D18.01 HEMANGIOMA OF SKIN AND SUBCUTANEOUS TISSUE: Status: ACTIVE | Noted: 2023-09-05

## 2023-09-05 PROCEDURE — ? COUNSELING

## 2023-09-05 PROCEDURE — 99212 OFFICE O/P EST SF 10 MIN: CPT

## 2023-09-05 ASSESSMENT — LOCATION ZONE DERM: LOCATION ZONE: TRUNK

## 2023-09-05 ASSESSMENT — LOCATION SIMPLE DESCRIPTION DERM
LOCATION SIMPLE: ABDOMEN
LOCATION SIMPLE: RIGHT UPPER BACK

## 2023-09-05 ASSESSMENT — LOCATION DETAILED DESCRIPTION DERM
LOCATION DETAILED: LEFT RIB CAGE
LOCATION DETAILED: RIGHT SUPERIOR LATERAL UPPER BACK

## 2023-10-06 ENCOUNTER — APPOINTMENT (RX ONLY)
Dept: URBAN - METROPOLITAN AREA CLINIC 352 | Facility: CLINIC | Age: 58
Setting detail: DERMATOLOGY
End: 2023-10-06

## 2023-10-06 DIAGNOSIS — L30.9 DERMATITIS, UNSPECIFIED: ICD-10-CM | Status: STABLE

## 2023-10-06 DIAGNOSIS — D22 MELANOCYTIC NEVI: ICD-10-CM

## 2023-10-06 DIAGNOSIS — L82.1 OTHER SEBORRHEIC KERATOSIS: ICD-10-CM

## 2023-10-06 PROBLEM — D22.9 MELANOCYTIC NEVI, UNSPECIFIED: Status: ACTIVE | Noted: 2023-10-06

## 2023-10-06 PROCEDURE — ? COUNSELING

## 2023-10-06 PROCEDURE — ? ADDITIONAL NOTES

## 2023-10-06 PROCEDURE — ? TREATMENT REGIMEN

## 2023-10-06 PROCEDURE — 99214 OFFICE O/P EST MOD 30 MIN: CPT

## 2023-10-06 PROCEDURE — ? PRESCRIPTION

## 2023-10-06 RX ORDER — CLOBETASOL PROPIONATE 0.5 MG/G
CREAM TOPICAL
Qty: 45 | Refills: 3 | Status: ERX | COMMUNITY
Start: 2023-10-06

## 2023-10-06 RX ADMIN — CLOBETASOL PROPIONATE: 0.5 CREAM TOPICAL at 00:00

## 2023-10-06 ASSESSMENT — LOCATION SIMPLE DESCRIPTION DERM: LOCATION SIMPLE: LEFT UPPER BACK

## 2023-10-06 ASSESSMENT — LOCATION DETAILED DESCRIPTION DERM: LOCATION DETAILED: LEFT MID-UPPER BACK

## 2023-10-06 ASSESSMENT — LOCATION ZONE DERM: LOCATION ZONE: TRUNK

## 2023-10-06 NOTE — PROCEDURE: ADDITIONAL NOTES
Additional Notes: Patient is been seen at [facility]. He is scheduled to have a peroneal nerve biopsy, Likely with undiagnosed connective tissue disease, now with shawl like dermatitic flare. Currently off immunosuppressants pending bx Additional Notes: Patient is been seen at [facility]. He is scheduled to have a peroneal nerve biopsy, Likely with undiagnosed connective tissue disease, now with shawl like dermatitic flare. Currently off immunosuppressants pending bx